# Patient Record
Sex: MALE | Race: WHITE | NOT HISPANIC OR LATINO
[De-identification: names, ages, dates, MRNs, and addresses within clinical notes are randomized per-mention and may not be internally consistent; named-entity substitution may affect disease eponyms.]

---

## 2023-04-15 ENCOUNTER — NON-APPOINTMENT (OUTPATIENT)
Age: 48
End: 2023-04-15

## 2023-04-18 PROBLEM — Z00.00 ENCOUNTER FOR PREVENTIVE HEALTH EXAMINATION: Status: ACTIVE | Noted: 2023-04-18

## 2023-04-20 ENCOUNTER — NON-APPOINTMENT (OUTPATIENT)
Age: 48
End: 2023-04-20

## 2023-04-21 ENCOUNTER — NON-APPOINTMENT (OUTPATIENT)
Age: 48
End: 2023-04-21

## 2023-04-21 ENCOUNTER — APPOINTMENT (OUTPATIENT)
Dept: ORTHOPEDIC SURGERY | Facility: CLINIC | Age: 48
End: 2023-04-21
Payer: COMMERCIAL

## 2023-04-21 VITALS
BODY MASS INDEX: 28.88 KG/M2 | HEART RATE: 88 BPM | SYSTOLIC BLOOD PRESSURE: 137 MMHG | WEIGHT: 195 LBS | DIASTOLIC BLOOD PRESSURE: 91 MMHG | HEIGHT: 69 IN

## 2023-04-21 DIAGNOSIS — M25.562 PAIN IN LEFT KNEE: ICD-10-CM

## 2023-04-21 DIAGNOSIS — M25.511 PAIN IN RIGHT SHOULDER: ICD-10-CM

## 2023-04-21 DIAGNOSIS — S83.429A SPRAIN OF LATERAL COLLATERAL LIGAMENT OF UNSPECIFIED KNEE, INITIAL ENCOUNTER: ICD-10-CM

## 2023-04-21 DIAGNOSIS — M24.811 OTHER SPECIFIC JOINT DERANGEMENTS OF RIGHT SHOULDER, NOT ELSEWHERE CLASSIFIED: ICD-10-CM

## 2023-04-21 DIAGNOSIS — S83.412A SPRAIN OF MEDIAL COLLATERAL LIGAMENT OF LEFT KNEE, INITIAL ENCOUNTER: ICD-10-CM

## 2023-04-21 PROCEDURE — 99204 OFFICE O/P NEW MOD 45 MIN: CPT

## 2023-04-21 PROCEDURE — 73030 X-RAY EXAM OF SHOULDER: CPT | Mod: RT

## 2023-04-21 PROCEDURE — 73564 X-RAY EXAM KNEE 4 OR MORE: CPT | Mod: LT

## 2023-04-21 PROCEDURE — 99203 OFFICE O/P NEW LOW 30 MIN: CPT

## 2023-04-21 RX ORDER — MELOXICAM 15 MG/1
15 TABLET ORAL
Qty: 30 | Refills: 1 | Status: ACTIVE | COMMUNITY
Start: 2023-04-21 | End: 1900-01-01

## 2023-04-21 NOTE — DISCUSSION/SUMMARY
[de-identified] : Left knee MCL and LCL sprains, right shoulder strain\par \par Extensive discussion of the natural history of this issue was had with the patient.  We discussed the treatment options focusing on conservative therapy which includes anti-inflammatories, physical therapy/home exercise, & activity modification.  \par -A prescription for meloxicam with the appropriate warnings for GI, cardiac, and renal side effects was given to the patient.  Patient was instructed not to use any other NSAIDs with this medication.\par Patient declined formal physical therapy.  States he has instructions for prior shoulder therapy he did.  States he would rather buy hinged knee brace himself.  Will return in 1 month for reevaluation.

## 2023-04-21 NOTE — HISTORY OF PRESENT ILLNESS
[de-identified] : 4/21/2023–patient presents with Left knee and right shoulder pain.  States he was snowboarding on Saturday when he fell.  Feels like he hyperextended his knee and shoulder.  Describes pain is on the medial lateral aspects of his knee.  Denies any radiating pain numbness and tingling or buckling symptoms.  Pain is diffuse in the shoulder with a little bit radiating to neck.  Tried ketorolac and steroids but has had minimal relief with that.  No prior treatment.  Denies allergies, anticoagulation or past medical history.

## 2023-04-21 NOTE — PHYSICAL EXAM
[de-identified] : General Appearance: normal without acute distress\par Mental: Alert and oriented x 3\par Psych/affect: appropriate, cooperative\par Gait: Normal gait\par \par Bilateral Shoulders\par \par ROM:\par FF: 160 degrees on right\par ER: 90 degrees\par IR behind back: Iliac crest on right\par \par RTC:\par Empty Can Sign: Negative\par ER lag Sign: Positive\par Belly Press/Lift Off Test: Negative\par Impingement:\par Ruiz/Neer Sign: Negative\par ACJ:\par Cross Arm Adduction Test: Negative\par Larue's Test - Superficial: Negative\par Labrum:\par Larue's Test - Deep: Negative\par Biceps:\par Speed's Test: Negative\par Instability:\par Apprehension/Relocation: Negative\par Grossly motor and sensory intact\par \par Left lower extremity\par Hip: Normal ROM without pain on IR/ER\par \par Knee\par Inspection: no effusion\par Alignment: neutral.\par Palpation: Tender to palpation medial femoral condyle and around lateral joint line\par ROM active (in degrees): 0-120, pain with deep flexion\par Ligamentous laxity: Pain with varus and valgus stress\par Meniscal Test: Positive McMurrays, negative Ziggy.\par Muscle Test: good quad strength. [de-identified] : 4/21/2023–right shoulder x-rays–4 views–no abnormalities\par Left knee xrays, standing AP/Lateral and Merchant films, and 45 degree PA standing view taken today in the office are reviewed and demonstrates preserved joint space, no abnormalities

## 2023-08-21 ENCOUNTER — NON-APPOINTMENT (OUTPATIENT)
Age: 48
End: 2023-08-21